# Patient Record
Sex: MALE | Race: WHITE | NOT HISPANIC OR LATINO | URBAN - METROPOLITAN AREA
[De-identification: names, ages, dates, MRNs, and addresses within clinical notes are randomized per-mention and may not be internally consistent; named-entity substitution may affect disease eponyms.]

---
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
## 2022-03-30 ENCOUNTER — EMERGENCY (EMERGENCY)
Facility: HOSPITAL | Age: 22
LOS: 0 days | Discharge: HOME | End: 2022-03-30
Attending: EMERGENCY MEDICINE | Admitting: EMERGENCY MEDICINE
Payer: COMMERCIAL

## 2022-03-30 VITALS
TEMPERATURE: 99 F | RESPIRATION RATE: 18 BRPM | OXYGEN SATURATION: 99 % | WEIGHT: 225.09 LBS | HEART RATE: 100 BPM | SYSTOLIC BLOOD PRESSURE: 137 MMHG | DIASTOLIC BLOOD PRESSURE: 87 MMHG

## 2022-03-30 DIAGNOSIS — R06.00 DYSPNEA, UNSPECIFIED: ICD-10-CM

## 2022-03-30 DIAGNOSIS — R05.1 ACUTE COUGH: ICD-10-CM

## 2022-03-30 DIAGNOSIS — R09.81 NASAL CONGESTION: ICD-10-CM

## 2022-03-30 PROCEDURE — 99284 EMERGENCY DEPT VISIT MOD MDM: CPT

## 2022-03-30 PROCEDURE — 71045 X-RAY EXAM CHEST 1 VIEW: CPT | Mod: 26

## 2022-03-30 NOTE — ED PROVIDER NOTE - OBJECTIVE STATEMENT
22 y/o M with no pmhx presents to the ED with cough, congestion and difficulty breathing. Pt states that these symptoms have been present x2-3 days. Initially seen by an UCC and started on Amoxicillin 2 days ago, then started on a Z-pack as well as Albuterol inhalers by PMD yesterday. Pt states that he is having minimal relief of symptoms and today developed worsening difficulty breathing which is why he presents. Denies any fevers, chills, nausea, vomiting, diarrhea, chest pain, back pain or ABD pain. Denies leg pain or leg swelling. Pt does endorse vaping xyears, recently trying to cut back. Of note, Pt's grandmother is hospitalized with similar symptoms. Covid-19 status unknown as Pt has not been tested.

## 2022-03-30 NOTE — ED PROVIDER NOTE - NSFOLLOWUPINSTRUCTIONS_ED_ALL_ED_FT
folow up your doctor  3-5 days       Cough, Adult  ImageCoughing is a reflex that clears your throat and your airways. Coughing helps to heal and protect your lungs. It is normal to cough occasionally, but a cough that happens with other symptoms or lasts a long time may be a sign of a condition that needs treatment. A cough may last only 2–3 weeks (acute), or it may last longer than 8 weeks (chronic).    What are the causes?  Coughing is commonly caused by:    Breathing in substances that irritate your lungs.  A viral or bacterial respiratory infection.  Allergies.  Asthma.  Postnasal drip.  Smoking.  Acid backing up from the stomach into the esophagus (gastroesophageal reflux).  Certain medicines.  Chronic lung problems, including COPD (or rarely, lung cancer).  Other medical conditions such as heart failure.    Follow these instructions at home:  Pay attention to any changes in your symptoms. Take these actions to help with your discomfort:    Take medicines only as told by your health care provider.    If you were prescribed an antibiotic medicine, take it as told by your health care provider. Do not stop taking the antibiotic even if you start to feel better.  Talk with your health care provider before you take a cough suppressant medicine.    Drink enough fluid to keep your urine clear or pale yellow.  If the air is dry, use a cold steam vaporizer or humidifier in your bedroom or your home to help loosen secretions.  Avoid anything that causes you to cough at work or at home.  If your cough is worse at night, try sleeping in a semi-upright position.  Avoid cigarette smoke. If you smoke, quit smoking. If you need help quitting, ask your health care provider.  Avoid caffeine.  Avoid alcohol.  Rest as needed.    Contact a health care provider if:  You have new symptoms.  You cough up pus.  Your cough does not get better after 2–3 weeks, or your cough gets worse.  You cannot control your cough with suppressant medicines and you are losing sleep.  You develop pain that is getting worse or pain that is not controlled with pain medicines.  You have a fever.  You have unexplained weight loss.  You have night sweats.  Get help right away if:  You cough up blood.  You have difficulty breathing.  Your heartbeat is very fast.  This information is not intended to replace advice given to you by your health care provider. Make sure you discuss any questions you have with your health care provider.

## 2022-03-30 NOTE — ED PROVIDER NOTE - NS ED ROS FT
Constitutional: (-) fever, (-) chills  Eyes: (-) visual changes  ENT: (+) nasal congestions  Cardiovascular: (-) chest pain, (-) syncope  Respiratory: (+) cough, (+) shortness of breath, (-) dyspnea,   Gastrointestinal: (-) vomiting, (-) diarrhea, (-)nausea,  Musculoskeletal: (-) neck pain, (-) back pain, (-) joint pain,  Integumentary: (-) rash, (-) edema, (-) bruises  Neurological: (-) headache, (-) loc, (-) dizziness, (-) tingling, (-)numbness,  Psychiatric: (-) hallucinations, (-)nervousness, (-)depression, (-)SI/HI  Peripheral Vascular: (-) leg swelling  :  (-)dysuria,  (-) hematuria  Allergic/Immunologic: (-) pruritus

## 2022-03-30 NOTE — ED PROVIDER NOTE - NS ED ATTENDING STATEMENT MOD
This was a shared visit with the HOANG. I reviewed and verified the documentation and independently performed the documented:

## 2022-03-30 NOTE — ED ADULT NURSE NOTE - NSIMPLEMENTINTERV_GEN_ALL_ED
Spontaneous, unlabored and symmetrical
Implemented All Universal Safety Interventions:  [place] to call system. Call bell, personal items and telephone within reach. Instruct patient to call for assistance. Room bathroom lighting operational. Non-slip footwear when patient is off stretcher. Physically safe environment: no spills, clutter or unnecessary equipment. Stretcher in lowest position, wheels locked, appropriate side rails in place.

## 2022-03-30 NOTE — ED PROVIDER NOTE - CLINICAL SUMMARY MEDICAL DECISION MAKING FREE TEXT BOX
21yM no pmhx  + vape pw nasal congestion cough  x 5 days  ,  seen by pmd  ucc given zpak,  IH.   pt here for persistent  coughing mild sob.   in ed pt alert well appearing   cvs rrr resp cta no wheezing rhonchi,  no tachypnea or accessory muscle use.  no le edema.  o299% not tachy, CXR no focal opacity no ptx  Patient to be discharged from ED in well appearing condition. Any available test results were discussed with and printed  for patient.  Verbal instructions given, including instructions to return to ED immediately for any new, worsening, or concerning symptoms. Limitations of ED work up discussed.  Patient reports understanding of above with capacity and insight. Written discharge instructions additionally given, including follow-up plan.

## 2022-03-30 NOTE — ED PROVIDER NOTE - PATIENT PORTAL LINK FT
You can access the FollowMyHealth Patient Portal offered by [facility] by registering at the following website: [email or web address]. By joining [employer]’s FollowMyHealth portal, you will also be able to view your health information using other applications (apps) compatible with our system.

## 2022-03-30 NOTE — ED PROVIDER NOTE - PHYSICAL EXAMINATION
Physical Exam  Vital Signs: I have reviewed the initial vital signs.  Constitutional: well-nourished, appears stated age, no acute distress  Eyes: Conjunctiva pink, Sclera clear  ENT: OP is clear without exudates, normal dentition, normal gingival, tongue without swelling  Cardiovascular: S1 and S2, regular rate, regular rhythm, well-perfused extremities, radial pulses equal and 2+  Respiratory: unlabored respiratory effort, clear to auscultation bilaterally no wheezing, rales and rhonchi  Gastrointestinal: soft, non-tender abdomen, no pulsatile mass, normal bowl sounds  Musculoskeletal: supple neck, no lower extremity edema, no midline tenderness  Integumentary: warm, dry, no rash  Neurologic: awake, alert, motor and sensory functions grossly intact  Psychiatric: appropriate mood, appropriate affect

## 2024-05-01 ENCOUNTER — EMERGENCY (EMERGENCY)
Facility: HOSPITAL | Age: 24
LOS: 0 days | Discharge: HOME | End: 2024-05-02
Attending: EMERGENCY MEDICINE
Payer: COMMERCIAL

## 2024-05-01 VITALS
OXYGEN SATURATION: 98 % | WEIGHT: 225.09 LBS | TEMPERATURE: 99 F | RESPIRATION RATE: 18 BRPM | SYSTOLIC BLOOD PRESSURE: 114 MMHG | HEART RATE: 94 BPM | HEIGHT: 69 IN | DIASTOLIC BLOOD PRESSURE: 72 MMHG

## 2024-05-01 DIAGNOSIS — R10.815 PERIUMBILIC ABDOMINAL TENDERNESS: ICD-10-CM

## 2024-05-01 DIAGNOSIS — R10.31 RIGHT LOWER QUADRANT PAIN: ICD-10-CM

## 2024-05-01 DIAGNOSIS — R19.7 DIARRHEA, UNSPECIFIED: ICD-10-CM

## 2024-05-01 DIAGNOSIS — R11.2 NAUSEA WITH VOMITING, UNSPECIFIED: ICD-10-CM

## 2024-05-01 LAB
ALBUMIN SERPL ELPH-MCNC: 4.4 G/DL — SIGNIFICANT CHANGE UP (ref 3.5–5.2)
ALP SERPL-CCNC: 81 U/L — SIGNIFICANT CHANGE UP (ref 30–115)
ALT FLD-CCNC: 36 U/L — SIGNIFICANT CHANGE UP (ref 0–41)
ANION GAP SERPL CALC-SCNC: 13 MMOL/L — SIGNIFICANT CHANGE UP (ref 7–14)
ANISOCYTOSIS BLD QL: SIGNIFICANT CHANGE UP
AST SERPL-CCNC: 21 U/L — SIGNIFICANT CHANGE UP (ref 0–41)
BASOPHILS # BLD AUTO: 0 K/UL — SIGNIFICANT CHANGE UP (ref 0–0.2)
BASOPHILS NFR BLD AUTO: 0 % — SIGNIFICANT CHANGE UP (ref 0–1)
BILIRUB SERPL-MCNC: 0.5 MG/DL — SIGNIFICANT CHANGE UP (ref 0.2–1.2)
BUN SERPL-MCNC: 16 MG/DL — SIGNIFICANT CHANGE UP (ref 10–20)
CALCIUM SERPL-MCNC: 9.1 MG/DL — SIGNIFICANT CHANGE UP (ref 8.4–10.4)
CHLORIDE SERPL-SCNC: 103 MMOL/L — SIGNIFICANT CHANGE UP (ref 98–110)
CO2 SERPL-SCNC: 20 MMOL/L — SIGNIFICANT CHANGE UP (ref 17–32)
CREAT SERPL-MCNC: 0.8 MG/DL — SIGNIFICANT CHANGE UP (ref 0.7–1.5)
DACRYOCYTES BLD QL SMEAR: SLIGHT — SIGNIFICANT CHANGE UP
EGFR: 128 ML/MIN/1.73M2 — SIGNIFICANT CHANGE UP
ELLIPTOCYTES BLD QL SMEAR: SLIGHT — SIGNIFICANT CHANGE UP
EOSINOPHIL # BLD AUTO: 0 K/UL — SIGNIFICANT CHANGE UP (ref 0–0.7)
EOSINOPHIL NFR BLD AUTO: 0 % — SIGNIFICANT CHANGE UP (ref 0–8)
GIANT PLATELETS BLD QL SMEAR: PRESENT — SIGNIFICANT CHANGE UP
GLUCOSE SERPL-MCNC: 81 MG/DL — SIGNIFICANT CHANGE UP (ref 70–99)
HCT VFR BLD CALC: 42.4 % — SIGNIFICANT CHANGE UP (ref 42–52)
HGB BLD-MCNC: 12.9 G/DL — LOW (ref 14–18)
LIDOCAIN IGE QN: 18 U/L — SIGNIFICANT CHANGE UP (ref 7–60)
LYMPHOCYTES # BLD AUTO: 1.28 K/UL — SIGNIFICANT CHANGE UP (ref 1.2–3.4)
LYMPHOCYTES # BLD AUTO: 13.5 % — LOW (ref 20.5–51.1)
MANUAL SMEAR VERIFICATION: SIGNIFICANT CHANGE UP
MCHC RBC-ENTMCNC: 19.4 PG — LOW (ref 27–31)
MCHC RBC-ENTMCNC: 30.4 G/DL — LOW (ref 32–37)
MCV RBC AUTO: 63.8 FL — LOW (ref 80–94)
MICROCYTES BLD QL: SIGNIFICANT CHANGE UP
MONOCYTES # BLD AUTO: 0 K/UL — LOW (ref 0.1–0.6)
MONOCYTES NFR BLD AUTO: 0 % — LOW (ref 1.7–9.3)
NEUTROPHILS # BLD AUTO: 7.59 K/UL — HIGH (ref 1.4–6.5)
NEUTROPHILS NFR BLD AUTO: 80.2 % — HIGH (ref 42.2–75.2)
PLAT MORPH BLD: NORMAL — SIGNIFICANT CHANGE UP
PLATELET # BLD AUTO: 223 K/UL — SIGNIFICANT CHANGE UP (ref 130–400)
PMV BLD: 11.3 FL — HIGH (ref 7.4–10.4)
POIKILOCYTOSIS BLD QL AUTO: SLIGHT — SIGNIFICANT CHANGE UP
POTASSIUM SERPL-MCNC: 4.3 MMOL/L — SIGNIFICANT CHANGE UP (ref 3.5–5)
POTASSIUM SERPL-SCNC: 4.3 MMOL/L — SIGNIFICANT CHANGE UP (ref 3.5–5)
PROMYELOCYTES # FLD: 3.6 % — HIGH (ref 0–0)
PROT SERPL-MCNC: 6.9 G/DL — SIGNIFICANT CHANGE UP (ref 6–8)
RBC # BLD: 6.65 M/UL — HIGH (ref 4.7–6.1)
RBC # FLD: 18.5 % — HIGH (ref 11.5–14.5)
RBC BLD AUTO: ABNORMAL
SMUDGE CELLS # BLD: PRESENT — SIGNIFICANT CHANGE UP
SODIUM SERPL-SCNC: 136 MMOL/L — SIGNIFICANT CHANGE UP (ref 135–146)
TARGETS BLD QL SMEAR: SLIGHT — SIGNIFICANT CHANGE UP
TOXIC GRANULES BLD QL SMEAR: PRESENT — SIGNIFICANT CHANGE UP
VARIANT LYMPHS # BLD: 2.7 % — SIGNIFICANT CHANGE UP (ref 0–5)
WBC # BLD: 9.47 K/UL — SIGNIFICANT CHANGE UP (ref 4.8–10.8)
WBC # FLD AUTO: 9.47 K/UL — SIGNIFICANT CHANGE UP (ref 4.8–10.8)

## 2024-05-01 PROCEDURE — 83690 ASSAY OF LIPASE: CPT

## 2024-05-01 PROCEDURE — 99285 EMERGENCY DEPT VISIT HI MDM: CPT

## 2024-05-01 PROCEDURE — 99284 EMERGENCY DEPT VISIT MOD MDM: CPT | Mod: 25

## 2024-05-01 PROCEDURE — 74177 CT ABD & PELVIS W/CONTRAST: CPT | Mod: MC

## 2024-05-01 PROCEDURE — 74177 CT ABD & PELVIS W/CONTRAST: CPT | Mod: 26,MC

## 2024-05-01 PROCEDURE — 85025 COMPLETE CBC W/AUTO DIFF WBC: CPT

## 2024-05-01 PROCEDURE — 36415 COLL VENOUS BLD VENIPUNCTURE: CPT

## 2024-05-01 PROCEDURE — 80053 COMPREHEN METABOLIC PANEL: CPT

## 2024-05-01 RX ORDER — SODIUM CHLORIDE 9 MG/ML
1000 INJECTION INTRAMUSCULAR; INTRAVENOUS; SUBCUTANEOUS ONCE
Refills: 0 | Status: COMPLETED | OUTPATIENT
Start: 2024-05-01 | End: 2024-05-01

## 2024-05-01 RX ORDER — IOHEXOL 300 MG/ML
30 INJECTION, SOLUTION INTRAVENOUS ONCE
Refills: 0 | Status: COMPLETED | OUTPATIENT
Start: 2024-05-01 | End: 2024-05-01

## 2024-05-01 RX ADMIN — SODIUM CHLORIDE 1000 MILLILITER(S): 9 INJECTION INTRAMUSCULAR; INTRAVENOUS; SUBCUTANEOUS at 20:32

## 2024-05-01 RX ADMIN — IOHEXOL 30 MILLILITER(S): 300 INJECTION, SOLUTION INTRAVENOUS at 20:38

## 2024-05-01 NOTE — ED PROVIDER NOTE - NS ED ROS FT
Constitutional: (-) fever  Eyes/ENT: (-) blurry vision, (-) epistaxis  Cardiovascular: (-) chest pain, (-) syncope  Respiratory: (-) cough, (-) shortness of breath  Gastrointestinal: (+) vomiting, (+) diarrhea (+) abdominal pain  Musculoskeletal: (-) neck pain, (-) back pain, (-) joint pain  Integumentary: (-) rash, (-) edema  Neurological: (-) headache, (-) altered mental status  Psychiatric: (-) hallucinations  Allergic/Immunologic: (-) pruritus

## 2024-05-01 NOTE — ED PROVIDER NOTE - PHYSICAL EXAMINATION
Physical Exam    Vital Signs: I have reviewed the initial vital signs.  Constitutional: well-nourished, appears stated age, no acute distress  Eyes: Conjunctiva pink, Sclera clear, PERRLA, EOMI.  Cardiovascular: S1 and S2, regular rate, regular rhythm, well-perfused extremities, radial pulses equal and 2+  Respiratory: unlabored respiratory effort, clear to auscultation bilaterally no wheezing, rales and rhonchi  Gastrointestinal: soft, tender abdomen to the RLQ, no pulsatile mass, normal bowl sounds. no CVAT  Musculoskeletal: supple neck, no lower extremity edema, no midline tenderness  Integumentary: warm, dry, no rash  Neurologic: awake, alert, cranial nerves II-XII grossly intact, extremities’ motor and sensory functions grossly intact  Psychiatric: appropriate mood, appropriate affect

## 2024-05-01 NOTE — ED PROVIDER NOTE - CLINICAL SUMMARY MEDICAL DECISION MAKING FREE TEXT BOX
Patient with abdominal pain.  Required labs, imaging and IVF.  No acute findings, will discharge with outpatient follow-up.

## 2024-05-01 NOTE — ED PROVIDER NOTE - PATIENT PORTAL LINK FT
You can access the FollowMyHealth Patient Portal offered by [facility] by registering at the following website: [email or web address]. By joining [employer]’s FollowMyHealth portal, you will also be able to view your health information using other applications (apps) compatible with our system.

## 2024-05-01 NOTE — ED ADULT TRIAGE NOTE - CHIEF COMPLAINT QUOTE
I've been dealing with this stomach bug for days, upset stomach . lethargic, nausea and pain - patient  Patient is employee of hospital

## 2024-05-01 NOTE — ED PROVIDER NOTE - NSFOLLOWUPINSTRUCTIONS_ED_ALL_ED_FT
You were evaluated in the Emergency Department today, and your visit did not reveal anything immediately life-threatening.    However, it is important that you follow-up with your PRIMARY CARE PHYSICIAN within 3-5 days.  If you do not have a primary care physician, please call your health insurance to select one.  If you do not have health insurance, please schedule an appointment with the [facility]: [phone], located at [address] [place] [address].  ------------------------------------------------------------------------------------------------------------------------  For pain / fever, you may take the following over-the-counter medication(s):  - Acetaminophen (Tylenol, Midol) 650-1000 mg up to every 4-6 hours, as needed (max 4000mg/24hrs),    For acid indigestion (heart burn symptoms), you may try the following over-the-counter medication:  - Famotidine (Pepcid) 20 mg, as needed (max 40mg/24hrs)  ------------------------------------------------------------------------------------------------------------------------  Abdominal Pain    Many things can cause abdominal pain. Many times, abdominal pain is not caused by a disease and will improve without treatment. Your health care provider will do a physical exam to determine if there is a dangerous cause of your pain; blood tests and imaging may help determine the cause of your pain. However, in many cases, no cause may be found and you may need further testing as an outpatient. Monitor your abdominal pain for any changes.     SEEK IMMEDIATE MEDICAL CARE IF YOU HAVE ANY OF THE FOLLOWING SYMPTOMS: worsening abdominal pain, uncontrollable vomiting, profuse diarrhea, inability to have bowel movements or pass gas, black or bloody stools, fever accompanying chest pain or back pain, or fainting. These symptoms may represent a serious problem that is an emergency. Do not wait to see if the symptoms will go away. Get medical help right away. Call 911 and do not drive yourself to the hospital.

## 2024-05-01 NOTE — ED PROVIDER NOTE - OBJECTIVE STATEMENT
Pt is a 23 year old male with PMH noted in chart presents to ED with complaints of abdominal pain. Pt states for last few days has been having nausea. vomiting and diarrhea as well as intermittent abdominal pain. Pt states had (2) episodes of NBNB vomiting as well as (2) episodes of NBNM diarrhea. Pt states pain is intermittent moderate, located to the RLQ with no alleviating factors. nobody else sick at home and no sick contacts. no recent travel outside country, no abx use. Denies any abdominal surgery in past. Denies any fever, chills, bodyaches, chest pain, sob, NVCD

## 2024-05-02 PROBLEM — Z78.9 OTHER SPECIFIED HEALTH STATUS: Chronic | Status: ACTIVE | Noted: 2022-03-31

## 2024-05-02 NOTE — ED ADULT NURSE NOTE - NSFALLUNIVINTERV_ED_ALL_ED
Bed/Stretcher in lowest position, wheels locked, appropriate side rails in place/Call bell, personal items and telephone in reach/Instruct patient to call for assistance before getting out of bed/chair/stretcher/Non-slip footwear applied when patient is off stretcher/[place] to call system/Physically safe environment - no spills, clutter or unnecessary equipment/Purposeful proactive rounding/Room/bathroom lighting operational, light cord in reach

## 2024-05-02 NOTE — ED ADULT NURSE NOTE - IN ACCORDANCE WITH [place] STATE LAW, WE OFFER EVERY PATIENT A HEPATITIS C TEST. WOULD YOU LIKE TO BE TESTED TODAY?
----- Message from Christian Gallo MD sent at 4/11/2018  5:04 PM CDT -----  Please tell patient his prostate, parathyroid hormone, blood sugar, and kidney tests were normal. The hepatitis C screen was negative.  The liver tests were okay.  Keep follow-up appointment 5/18/18.   Opt out

## 2024-07-29 ENCOUNTER — EMERGENCY (EMERGENCY)
Facility: HOSPITAL | Age: 24
LOS: 0 days | Discharge: HOME | End: 2024-07-29
Attending: EMERGENCY MEDICINE
Payer: COMMERCIAL

## 2024-07-29 VITALS
RESPIRATION RATE: 17 BRPM | WEIGHT: 220.9 LBS | DIASTOLIC BLOOD PRESSURE: 82 MMHG | TEMPERATURE: 98 F | OXYGEN SATURATION: 98 % | SYSTOLIC BLOOD PRESSURE: 119 MMHG | HEIGHT: 69 IN | HEART RATE: 89 BPM

## 2024-07-29 DIAGNOSIS — M79.645 PAIN IN LEFT FINGER(S): ICD-10-CM

## 2024-07-29 DIAGNOSIS — M79.642 PAIN IN LEFT HAND: ICD-10-CM

## 2024-07-29 DIAGNOSIS — M25.562 PAIN IN LEFT KNEE: ICD-10-CM

## 2024-07-29 DIAGNOSIS — M54.50 LOW BACK PAIN, UNSPECIFIED: ICD-10-CM
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
## 2024-07-29 DIAGNOSIS — S83.92XA SPRAIN OF UNSPECIFIED SITE OF LEFT KNEE, INITIAL ENCOUNTER: ICD-10-CM
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
## 2024-07-29 PROCEDURE — 73130 X-RAY EXAM OF HAND: CPT | Mod: LT

## 2024-07-29 PROCEDURE — 73564 X-RAY EXAM KNEE 4 OR MORE: CPT | Mod: LT

## 2024-07-29 PROCEDURE — 99284 EMERGENCY DEPT VISIT MOD MDM: CPT | Mod: 25

## 2024-07-29 PROCEDURE — 73564 X-RAY EXAM KNEE 4 OR MORE: CPT | Mod: 26,LT

## 2024-07-29 PROCEDURE — 99053 MED SERV 10PM-8AM 24 HR FAC: CPT

## 2024-07-29 PROCEDURE — 99284 EMERGENCY DEPT VISIT MOD MDM: CPT

## 2024-07-29 PROCEDURE — 73130 X-RAY EXAM OF HAND: CPT | Mod: 26,LT

## 2024-07-29 RX ORDER — METHOCARBAMOL 500 MG
2 TABLET ORAL
Qty: 20 | Refills: 0
Start: 2024-07-29 | End: 2024-08-02

## 2024-07-29 RX ORDER — LIDOCAINE HCL 28 MG/G
1 GEL TOPICAL
Qty: 6 | Refills: 0
Start: 2024-07-29 | End: 2024-07-31

## 2024-07-29 RX ORDER — METHOCARBAMOL 500 MG
1500 TABLET ORAL ONCE
Refills: 0 | Status: COMPLETED | OUTPATIENT
Start: 2024-07-29 | End: 2024-07-29

## 2024-07-29 RX ADMIN — Medication 600 MILLIGRAM(S): at 01:44

## 2024-07-29 RX ADMIN — Medication 1500 MILLIGRAM(S): at 01:44
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
## 2024-07-29 NOTE — ED ADULT NURSE NOTE - PRIMARY CARE PROVIDER
EXAM: CT CHEST WITHOUT CONTRAST



HISTORY: Worsening infiltrates



COMPARISON: CT chest 9/19/2021. Chest radiograph 9/24/2021



TECHNIQUE:  Helical CT of the chest performed without contrast. Coronal and sagittal reformats were o
btained.



One or more of the following individualized dose reduction techniques were utilized for this examinat
ion:  



1. Automated exposure control

2. Adjustment of the mA and/or kV according to patient size

3. Use of iterative reconstruction technique.



FINDINGS: 



Thyroid gland and thoracic inlet: Unchanged small hypodense nodules in the right thyroid lobe measuri
ng up to 9 mm.



Heart and great vessels: Heart is normal in size. There are extensive coronary artery calcifications.
 No pericardial effusion. Thoracic aorta is normal in caliber with severe calcified atherosclerosis.



Mediastinum and lay: There are small mediastinal and hilar lymph nodes, nonspecific but likely react
jose.



Lungs and pleura: There is significant motion artifact. Right apical consolidative opacities are unch
anged. Small scattered nodular opacities elsewhere are likely unchanged allowing for differences in s
lice thickness of CTs and motion artifact. There are increased small bilateral pleural effusions and 
adjacent consolidations or atelectasis in the lower lobes. Unchanged mild atelectasis in the lingula 
and right middle lobe. Interlobular septal thickening at the apices is less conspicuous although eval
uation is limited by motion artifact.



Chest wall and axillae: No axillary lymphadenopathy.



Upper abdomen: Unremarkable.



Bones: There is severe right glenohumeral osteoarthrosis. The patient is rotated and slightly tilted 
to the left. Mild thoracic degenerative disc disease.



IMPRESSION: 

1.  The exam is limited by motion artifact.

2.  There are increased, small right greater than left pleural effusions and adjacent atelectasis or 
consolidation.

3.  No significant change in consolidative opacities in the right apex and small scattered nodular op
acities elsewhere, allowing for differences in technique. Interlobular septal thickening at the apice
s is less conspicuous but evaluation is limited by motion artifact.

4.  Coronary artery and aortic calcifications.

 



Electronically signed by: Caitlyn Vo MD (9/25/2021 3:28 PM) [id number] pmd
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
## 2024-07-29 NOTE — ED PROVIDER NOTE - CARE PROVIDER_API CALL
Naveen Elmore  Orthopaedic Surgery  [address] [place] [address]  Phone: [phone]  Fax: [phone]  Follow Up Time:

## 2024-07-29 NOTE — ED PROVIDER NOTE - PHYSICAL EXAMINATION
CONSTITUTIONAL: Well-appearing; well-nourished; in no apparent distress.   EYES: PERRL; EOM intact.   ENT: normal nose; no rhinorrhea; normal pharynx with no tonsillar hypertrophy.   NECK: Supple; non-tender; no cervical lymphadenopathy.   CARDIOVASCULAR: Normal S1, S2; no murmurs, rubs, or gallops. Equal radial pulses  CHEST: no chest wall ttp.  RESPIRATORY: Normal chest excursion with respiration; breath sounds clear and equal bilaterally; no wheezes, rhonchi, or rales.  GI/: Normal bowel sounds; non-distended; non-tender; no palpable organomegaly.   MS: No evidence of trauma or deformity. No midline spinal ttp. Stable pelvis. Normal ROM in all four extremities; + mild ttp to L lower back. distal pulses are normal.   SKIN: Normal for age and race; warm; dry; good turgor; no apparent lesions or exudate.   NEURO/PSYCH: A & O x 4; grossly unremarkable. mood and manner are appropriate. Grooming and personal hygiene are appropriate. No apparent thoughts of harm to self or others.
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
## 2024-07-29 NOTE — ED PROVIDER NOTE - PATIENT PORTAL LINK FT
You can access the FollowMyHealth Patient Portal offered by [facility] by registering at the following website: [email or web address]. By joining [employer]s FollowMyHealth portal, you will also be able to view your health information using other applications (apps) compatible with our system.

## 2024-07-30 ENCOUNTER — APPOINTMENT (OUTPATIENT)
Dept: ORTHOPEDIC SURGERY | Facility: CLINIC | Age: 24
End: 2024-07-30
Payer: COMMERCIAL

## 2024-07-30 VITALS — WEIGHT: 225 LBS | BODY MASS INDEX: 33.33 KG/M2 | HEIGHT: 69 IN

## 2024-07-30 DIAGNOSIS — S34.109A UNSPECIFIED INJURY TO UNSPECIFIED LVL OF LUMBAR SPINAL CORD, INITIAL ENCOUNTER: ICD-10-CM

## 2024-07-30 PROBLEM — Z00.00 ENCOUNTER FOR PREVENTIVE HEALTH EXAMINATION: Status: ACTIVE | Noted: 2024-07-30

## 2024-07-30 PROCEDURE — 72100 X-RAY EXAM L-S SPINE 2/3 VWS: CPT

## 2024-07-30 PROCEDURE — 99203 OFFICE O/P NEW LOW 30 MIN: CPT | Mod: ACP

## 2024-07-30 RX ORDER — MELOXICAM 15 MG/1
15 TABLET ORAL
Qty: 30 | Refills: 2 | Status: ACTIVE | COMMUNITY
Start: 2024-07-30 | End: 1900-01-01

## 2024-07-30 RX ORDER — TIZANIDINE 4 MG/1
4 TABLET ORAL
Qty: 7 | Refills: 0 | Status: ACTIVE | COMMUNITY
Start: 2024-07-30 | End: 1900-01-01

## 2024-08-01 NOTE — DISCUSSION/SUMMARY
[de-identified] : My clinical suspicion is high for a strain of the lumbar musculature given the patient's history, physical examination findings, and x-ray findings.   I recommended anti-inflammatory medication.  Meloxicam sent to patient's pharmacy to be taken as needed for pain. Benefits discussed. Confirmed no contraindication to NSAIDs. Also, tizanidine sent to patient's pharmacy to be taken as needed for pain.  Advised patient that this medication may make the patient drowsy and so to avoid any driving or operating heavy machinery when taking it. Red flag symptoms discussed.  Risks were discussed. Patient expresses full understanding.   Prescription for physical therapy was provided for strengthening, stretching, and range of motion of the lumbar musculature.   I recommended patient rest, ice, compress, and elevate the lower extremities regularly. Encouraged activity modification as tolerable. Encouraged gentle range of motion to avoid stiffness.    All questions and concerns addressed to patient's satisfaction. Patient expresses full understanding of treatment plan. Will give the patient follow-up in 3 to 4 weeks with our spine specialist for repeat evaluation and treatment.

## 2024-08-01 NOTE — IMAGING
[de-identified] : Physical examination of the lumbar spine: No swelling, ecchymosis, erythema appreciated skin is intact.  No midline neck or paraspinal tenderness.  Mild tenderness in the left lumbar musculature.  Negative straight leg raise.  Full range of motion.  Good strength throughout.  Sensorimotor intact distally.  Neuro vas intact.  X-rays of the lumbar spine taken in the office today:  No acute fractures, subluxations, or dislocations.
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
## 2024-08-26 ENCOUNTER — APPOINTMENT (OUTPATIENT)
Dept: ORTHOPEDIC SURGERY | Facility: CLINIC | Age: 24
End: 2024-08-26
Payer: COMMERCIAL

## 2024-08-26 DIAGNOSIS — S34.109A UNSPECIFIED INJURY TO UNSPECIFIED LVL OF LUMBAR SPINAL CORD, INITIAL ENCOUNTER: ICD-10-CM

## 2024-08-26 PROCEDURE — 99203 OFFICE O/P NEW LOW 30 MIN: CPT
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
## 2024-08-26 NOTE — IMAGING
[de-identified] : TTP midline spine and paraspinal musculature  Strength                                          Hip flexor   Right: 5/5; Left: 5/5                              Knee extensor     Right: 5/5; Left: 5/5                      Ankle dorsiflexion   Right: 5/5; Left: 5/5                   EHL           Right: 5/5; Left: 5/5                                 Ankle plantarflexion       Right: 5/5; Left: 5/5  Sensation L1   Right: 2/2; Left: 2/2 L2   Right: 2/2; Left: 2/2 L3   Right: 2/2; Left: 2/2 L4   Right: 2/2; Left: 2/2 L5   Right: 2/2; Left: 2/2 S1   Right: 2/2; Left: 2/2  Reflexes Patella   Right: 2+; Left 2+ Achilles   Right: 2+; Left 2+ Clonus  Right: absent; L: absent
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
## 2024-08-26 NOTE — DATA REVIEWED
[FreeTextEntry1] : I reviewed the patient's AP lateral lumbar x-rays from the prior visit no fractures no dislocations.

## 2025-05-15 ENCOUNTER — EMERGENCY (EMERGENCY)
Facility: HOSPITAL | Age: 25
LOS: 0 days | Discharge: HOME | End: 2025-05-15
Attending: EMERGENCY MEDICINE
Payer: COMMERCIAL

## 2025-05-15 VITALS
WEIGHT: 179.9 LBS | SYSTOLIC BLOOD PRESSURE: 128 MMHG | OXYGEN SATURATION: 99 % | DIASTOLIC BLOOD PRESSURE: 85 MMHG | HEART RATE: 88 BPM | RESPIRATION RATE: 18 BRPM | TEMPERATURE: 98 F

## 2025-05-15 DIAGNOSIS — S93.402A SPRAIN OF UNSPECIFIED LIGAMENT OF LEFT ANKLE, INITIAL ENCOUNTER: ICD-10-CM
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
## 2025-05-15 DIAGNOSIS — M25.572 PAIN IN LEFT ANKLE AND JOINTS OF LEFT FOOT: ICD-10-CM

## 2025-05-15 PROCEDURE — 73610 X-RAY EXAM OF ANKLE: CPT | Mod: LT

## 2025-05-15 PROCEDURE — 99284 EMERGENCY DEPT VISIT MOD MDM: CPT

## 2025-05-15 PROCEDURE — 73610 X-RAY EXAM OF ANKLE: CPT | Mod: 26,LT

## 2025-05-15 PROCEDURE — 99283 EMERGENCY DEPT VISIT LOW MDM: CPT | Mod: 25

## 2025-05-15 RX ORDER — IBUPROFEN 200 MG
800 TABLET ORAL ONCE
Refills: 0 | Status: COMPLETED | OUTPATIENT
Start: 2025-05-15 | End: 2025-05-15

## 2025-05-15 RX ADMIN — Medication 800 MILLIGRAM(S): at 22:14

## 2025-05-15 NOTE — ED PROVIDER NOTE - PHYSICAL EXAMINATION
CONST: NAD, WDWN  EYES: conjunctiva pink   ENT: Moist mucous membranes, no nasal discharge.   NECK: Supple, non-tender, no resistance  to flexion  CARD: Regular rate and rhythm  RESP: No resp distress, CTA b/l, no w/r/r  MS: L ankle without deformity, no bony tenderness, FROM with some discomfort laterally, no proximal fib tenderness, no metatarsal tenderness, digits normal  SKIN: Warm, dry, no acute rashes. Good turgor  NEURO: A&Ox3, No focal deficits. Strength 5/5 with no sensory deficits

## 2025-05-15 NOTE — ED PROVIDER NOTE - NSFOLLOWUPINSTRUCTIONS_ED_ALL_ED_FT
FOLLOW UP WITH THE ORTHOPEDIST. TAKE 600MG IBUPROFEN EVERY 6 HOURS IF NEEDED    Our Emergency Department Referral Coordinators will be reaching out to you in the next 24-48 hours from 9:00am to 5:00pm to schedule a follow up appointment. Please expect a phone call from the hospital in that time frame. If you do not receive a call or if you have any questions or concerns, you can reach them at   [phone]      Sprain    A sprain is a stretch or tear in one of the tough, fiber-like tissues (ligaments) in your body. This is caused by an injury to the area such as a twisting mechanism. Symptoms include pain, swelling, or bruising. Rest that area over the next several days and slowly resume activity when tolerated. Ice can help with swelling and pain.     SEEK IMMEDIATE MEDICAL CARE IF YOU HAVE ANY OF THE FOLLOWING SYMPTOMS: worsening pain, inability to move that body part, numbness or tingling.

## 2025-05-15 NOTE — ED PROVIDER NOTE - ATTENDING APP SHARED VISIT CONTRIBUTION OF CARE
24-year-old male otherwise healthy presents for evaluation nontraumatic left ankle pain for the past 4 days.  Patient states that on his feet all day and that he works out a lot which includes running.  Denies any redness, no swelling, no bruising.  On exam patient is nontoxic-appearing.  Has tenderness to palpation in bilateral malleoli of the left ankle, DP pulse intact, no notable edema.  Plan is imaging, pain meds and reassess.

## 2025-05-15 NOTE — ED ADULT NURSE NOTE - BREATHING, MLM
Spontaneous, unlabored and symmetrical Patient stable.  No global headache, mild localized tenderness over small hematoma.  CAT scan results discussed with patient.  No evidence of intracranial hemorrhage or skull fracture.  No evidence of acute vertebral fracture.  Did discuss findings of grade 1 retrolisthesis, unsure if acute or chronic.  Suspect chronic as pain in neck minimal in nature.  No radicular symptoms.  Will provide referral to orthopedics if pain continues or fails to improve.

## 2025-05-15 NOTE — ED PROVIDER NOTE - PATIENT PORTAL LINK FT
You can access the FollowMyHealth Patient Portal offered by [facility] by registering at the following website: [email or web address]. By joining [employer]’s FollowMyHealth portal, you will also be able to view your health information using other applications (apps) compatible with our system.

## 2025-05-15 NOTE — ED PROVIDER NOTE - CARE PROVIDER_API CALL
Paul Mandujano  Orthopaedic Surgery  [address] [place] [address]  Phone: [phone]  Fax: [phone]  Follow Up Time: 4-6 Days

## 2025-05-15 NOTE — ED PROVIDER NOTE - OBJECTIVE STATEMENT
23yo m no sig pmh c/o left ankle pain x several days with no known injury but admits he is very active at baseline. 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 Pt admits to some swelling to left ankle. Denies calf pain. Pain is worse with weightbearing but he is able to perform ADLs without difficulty

## 2025-05-15 NOTE — ED ADULT TRIAGE NOTE - CHIEF COMPLAINT QUOTE
pt complains of left foot pain since sunday, pt denies any injury or trauma, states he works out and play sports a lot, has difficulty ambulating

## 2025-05-16 ENCOUNTER — APPOINTMENT (OUTPATIENT)
Dept: ORTHOPEDIC SURGERY | Facility: CLINIC | Age: 25
End: 2025-05-16
Payer: COMMERCIAL

## 2025-05-16 DIAGNOSIS — M76.62 ACHILLES TENDINITIS, LEFT LEG: ICD-10-CM

## 2025-05-16 DIAGNOSIS — M76.822 POSTERIOR TIBIAL TENDINITIS, LEFT LEG: ICD-10-CM

## 2025-05-16 PROCEDURE — 99213 OFFICE O/P EST LOW 20 MIN: CPT

## 2025-05-16 RX ORDER — NABUMETONE 750 MG/1
750 TABLET, FILM COATED ORAL TWICE DAILY
Qty: 60 | Refills: 0 | Status: ACTIVE | COMMUNITY
Start: 2025-05-16 | End: 1900-01-01

## 2025-06-17 ENCOUNTER — RX RENEWAL (OUTPATIENT)
Age: 25
End: 2025-06-17

## 2025-06-25 ENCOUNTER — EMERGENCY (EMERGENCY)
Facility: HOSPITAL | Age: 25
LOS: 0 days | Discharge: HOME | End: 2025-06-25
Attending: EMERGENCY MEDICINE
Payer: COMMERCIAL

## 2025-06-25 VITALS
RESPIRATION RATE: 18 BRPM | HEART RATE: 82 BPM | OXYGEN SATURATION: 99 % | SYSTOLIC BLOOD PRESSURE: 122 MMHG | TEMPERATURE: 99 F | DIASTOLIC BLOOD PRESSURE: 80 MMHG | WEIGHT: 225.09 LBS
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
## 2025-06-25 DIAGNOSIS — S60.416A ABRASION OF RIGHT LITTLE FINGER, INITIAL ENCOUNTER: ICD-10-CM

## 2025-06-25 DIAGNOSIS — Z23 ENCOUNTER FOR IMMUNIZATION: ICD-10-CM
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
## 2025-06-25 PROCEDURE — 99283 EMERGENCY DEPT VISIT LOW MDM: CPT | Mod: 25

## 2025-06-25 PROCEDURE — 90715 TDAP VACCINE 7 YRS/> IM: CPT

## 2025-06-25 PROCEDURE — 90471 IMMUNIZATION ADMIN: CPT

## 2025-06-25 PROCEDURE — 99284 EMERGENCY DEPT VISIT MOD MDM: CPT

## 2025-06-25 NOTE — ED PROVIDER NOTE - PHYSICAL EXAMINATION
VITAL SIGNS: I have reviewed nursing notes and confirm.  CONSTITUTIONAL: Well-developed; well-nourished; in no acute distress.  SKIN: Small abrasion right 5th digit   HEAD: Normocephalic; atraumatic.  EYES: Conjunctiva and sclera clear.  NECK: Supple  CARD: Regular rate and rhythm.  RESP: Speaking in full sentences.   EXT: Normal ROM. No clubbing, cyanosis or edema. capillary refill <2 seconds   NEURO: Alert, oriented. Grossly unremarkable. No focal deficits.   PSYCH: Cooperative, appropriate. VITAL SIGNS: I have reviewed nursing notes and confirm.  CONSTITUTIONAL: Well-developed; well-nourished; in no acute distress.  SKIN: Small abrasion right 5th digit above the nail.   HEAD: Normocephalic; atraumatic.  EYES: Conjunctiva and sclera clear.  NECK: Supple  CARD: Regular rate and rhythm.  RESP: Speaking in full sentences.   EXT: Normal ROM. No clubbing, cyanosis or edema. capillary refill <2 seconds   NEURO: Alert, oriented. Grossly unremarkable. No focal deficits.   PSYCH: Cooperative, appropriate.

## 2025-06-25 NOTE — ED ADULT TRIAGE NOTE - NSWEIGHTCALCTOOLDRUG_GEN_A_CORE
See the printed discharge instructions.  Use prescription medications as directed.  Wear eye patch at left eye while sleeping.  You may use artificial tears to moisten left eye while awake.  You may also use your gabapentin tablets as directed for scalp pain.  Follow-up with neurologist.  See referral to Dr. Hurd.    used

## 2025-06-25 NOTE — ED ADULT NURSE NOTE - OBJECTIVE STATEMENT
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 a piece of metal got caught underneath his nail  bleeding controlled

## 2025-06-25 NOTE — ED ADULT TRIAGE NOTE - CHIEF COMPLAINT QUOTE
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

## 2025-06-25 NOTE — ED PROVIDER NOTE - PROGRESS NOTE DETAILS
FF; pt finger was irrigated an dressed. pt advised of strict return precautions discussed at bedside. agreeable to dc.

## 2025-06-25 NOTE — ED PROVIDER NOTE - PATIENT PORTAL LINK FT
You can access the FollowMyHealth Patient Portal offered by [facility] by registering at the following website: [email or web address]. By joining [employer]’s FollowMyHealth portal, you will also be able to view your health information using other applications (apps) compatible with our system.

## 2025-06-25 NOTE — ED PROVIDER NOTE - CLINICAL SUMMARY MEDICAL DECISION MAKING FREE TEXT BOX
Healthy, 24-year-old male unknown last tetanus vaccine here requesting Tdap update after he sustained a superficial cut to his finger from a piece of metal while at work.    Patient has small superficial cu cut to finger.  No active bleeding.  Wound was irrigated extensively with water, bacitracin and Band-Aid applied.  Tdap was updated.    Advised patient need for close monitoring of symptoms, return precautions and follow-up.

## 2025-06-25 NOTE — ED PROVIDER NOTE - NSFOLLOWUPINSTRUCTIONS_ED_ALL_ED_FT
Puncture Wound    WHAT YOU NEED TO KNOW:    A puncture wound is a hole in the skin made by a sharp, pointed object.Puncture Wound         DISCHARGE INSTRUCTIONS:    Return to the emergency department if:     You have severe pain.      You have numbness or tingling in the area of your wound.      Your wound starts bleeding and does not stop, even after you apply pressure.    Contact your healthcare provider if:     You have new drainage or a bad odor coming from the wound.      You have a fever.      You have increased swelling, redness, or pain.      You have red streaks on your skin coming from your wound.      You have questions or concerns about your condition or care.    Medicines: You may need any of the following:    NSAIDs, such as ibuprofen, help decrease swelling, pain, and fever. This medicine is available with or without a doctor's order. NSAIDs can cause stomach bleeding or kidney problems in certain people. If you take blood thinner medicine, always ask your healthcare provider if NSAIDs are safe for you. Always read the medicine label and follow directions.      Antibiotics help treat a bacterial infection.       Take your medicine as directed. Contact your healthcare provider if you think your medicine is not helping or if you have side effects. Tell him of her if you are allergic to any medicine. Keep a list of the medicines, vitamins, and herbs you take. Include the amounts, and when and why you take them. Bring the list or the pill bottles to follow-up visits. Carry your medicine list with you in case of an emergency.    Wound care: Keep your wound clean and dry. When you are allowed to bathe, carefully wash the wound with soap and water. Dry the area and put on new, clean bandages as directed. Change your bandages when they get wet or dirty.    Manage your symptoms:     Rest your injured area as much as possible. If the puncture wound is in your leg or foot, use crutches as directed. This will help keep the weight off your injured leg or foot as it heals.       Elevate your injured area above the level of your heart as often as you can. This will help decrease swelling and pain. Prop your injured area on pillows or blankets to keep it elevated comfortably.     Follow up with your healthcare provider in 2 to 3 days: Write down your questions so you remember to ask them during your visits.       © Copyright [employer] 2019 All illustrations and images included in CareNotes are the copyrighted property of [employer] or [employer].

## 2025-06-25 NOTE — ED ADULT NURSE NOTE - NSFALLUNIVINTERV_ED_ALL_ED
Bed/Stretcher in lowest position, wheels locked, appropriate side rails in place/Call bell, personal items and telephone in reach/Instruct patient to call for assistance before getting out of bed/chair/stretcher/Non-slip footwear applied when patient is off stretcher/[place] to call system/Physically safe environment - no spills, clutter or unnecessary equipment/Purposeful proactive rounding/Room/bathroom lighting operational, light cord in reach

## 2025-06-25 NOTE — ED PROVIDER NOTE - OBJECTIVE STATEMENT
24-year-old male no reported past medical history presenting status post finger injury.  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  Patient is right-hand dominant.  Patient has no difficulty with range of motion, no swelling, numbness, tingling or erythema.  Patient was minimal amount of blood.  Unknown tetanus status.  No other injuries 24-year-old male no reported past medical history presenting status post right pinky finger injury.  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  Patient is right-hand dominant.  Patient has no difficulty with range of motion, no swelling, numbness, tingling or erythema.  Patient was minimal amount of blood.  Unknown tetanus status.  No other injuries

## 2025-06-26 ENCOUNTER — APPOINTMENT (OUTPATIENT)
Dept: ORTHOPEDIC SURGERY | Facility: CLINIC | Age: 25
End: 2025-06-26